# Patient Record
Sex: MALE | Race: WHITE | Employment: UNEMPLOYED | URBAN - METROPOLITAN AREA
[De-identification: names, ages, dates, MRNs, and addresses within clinical notes are randomized per-mention and may not be internally consistent; named-entity substitution may affect disease eponyms.]

---

## 2017-11-30 ENCOUNTER — HOSPITAL ENCOUNTER (EMERGENCY)
Facility: HOSPITAL | Age: 32
Discharge: HOME | End: 2017-11-30
Attending: EMERGENCY MEDICINE

## 2017-11-30 VITALS
HEIGHT: 70 IN | TEMPERATURE: 98 F | RESPIRATION RATE: 16 BRPM | WEIGHT: 150 LBS | SYSTOLIC BLOOD PRESSURE: 118 MMHG | DIASTOLIC BLOOD PRESSURE: 76 MMHG | OXYGEN SATURATION: 98 % | HEART RATE: 70 BPM | BODY MASS INDEX: 21.47 KG/M2

## 2017-11-30 DIAGNOSIS — L03.116 LEFT LEG CELLULITIS: Primary | ICD-10-CM

## 2017-11-30 PROCEDURE — 99283 EMERGENCY DEPT VISIT LOW MDM: CPT

## 2017-11-30 RX ORDER — SULFAMETHOXAZOLE AND TRIMETHOPRIM 800; 160 MG/1; MG/1
1 TABLET ORAL 2 TIMES DAILY
Qty: 20 TABLET | Refills: 0 | Status: SHIPPED | OUTPATIENT
Start: 2017-11-30 | End: 2017-12-10

## 2017-11-30 RX ORDER — TRAMADOL HYDROCHLORIDE 50 MG/1
TABLET ORAL EVERY 4 HOURS PRN
Qty: 20 TABLET | Refills: 0 | Status: SHIPPED | OUTPATIENT
Start: 2017-11-30 | End: 2017-12-07

## 2017-11-30 RX ORDER — AMOXICILLIN 500 MG/1
500 CAPSULE ORAL 3 TIMES DAILY
COMMUNITY

## 2017-11-30 NOTE — ED INITIAL ASSESSMENT (HPI)
States that he fell on Lt lower leg. Had recent surgery and states he had a \" vein removed\" to the Lt leg 1 1/2 week age.   No fever

## 2017-11-30 NOTE — ED NOTES
Pt presents to the ED with the open left lower leg surgical site. Pt states he has noticed increased bleeding with dressing changes after he tripped. Neuro: WNL  HEENT: WNL  Resp: WNL  Cardiac:  WNL  GI: WNL  : WNL  Skin: Open surgical incision left l

## 2017-11-30 NOTE — ED PROVIDER NOTES
Patient Seen in: [facility] AND [facility] Emergency Department    History   Patient presents with:  Leg Pain      HPI    The patient presents complaining of bleeding from his left lower leg.   He states he had recent surgery to remove a \"vein\" from his left lo warm and dry. Large open wound to the subcutaneous tissue  on the left medial lower leg. A longitudinal incision with granulation tissue present. No drainage or foul smell.   Swelling of the calf with mild overlying erythema and mild erythematous streak discussed with the patient and/or caregiver.       Condition upon leaving the department: Stable    Disposition and Plan     Clinical Impression:  Left leg cellulitis  (primary encounter diagnosis)    Disposition:  Discharge    Follow-up:  Your doctor    Sc

## (undated) NOTE — ED AVS SNAPSHOT
Aggie Blackmon   MRN: [id number]    Department:  [facility] Emergency Department   Date of Visit:  11/30/2017           Disclosure     Insurance plans vary and the physician(s) referred by the ER may not be covered by your plan.  Please contact CARE PHYSICIAN AT ONCE OR RETURN IMMEDIATELY TO THE EMERGENCY DEPARTMENT. If you have been prescribed any medication(s), please fill your prescription right away and begin taking the medication(s) as directed.   If you believe that any of the medications